# Patient Record
Sex: FEMALE | Employment: FULL TIME | URBAN - METROPOLITAN AREA
[De-identification: names, ages, dates, MRNs, and addresses within clinical notes are randomized per-mention and may not be internally consistent; named-entity substitution may affect disease eponyms.]

---

## 2021-05-28 ENCOUNTER — TELEPHONE (OUTPATIENT)
Dept: ORTHOPEDICS CLINIC | Facility: CLINIC | Age: 56
End: 2021-05-28

## 2021-05-28 NOTE — TELEPHONE ENCOUNTER
I called Gurinder Gerard to tell her Dr. Davian Mancuso recommends Dr. Quiana Naranjo [place] [facility] [place]

## 2021-05-28 NOTE — TELEPHONE ENCOUNTER
Patient is looking for a recommendation for orthopaedic physician associated with [facility].   Please [phone]